# Patient Record
Sex: FEMALE | Race: OTHER | Employment: UNEMPLOYED | URBAN - METROPOLITAN AREA
[De-identification: names, ages, dates, MRNs, and addresses within clinical notes are randomized per-mention and may not be internally consistent; named-entity substitution may affect disease eponyms.]

---

## 2024-01-15 ENCOUNTER — OFFICE VISIT (OUTPATIENT)
Age: 29
End: 2024-01-15

## 2024-01-15 VITALS
SYSTOLIC BLOOD PRESSURE: 101 MMHG | BODY MASS INDEX: 24.38 KG/M2 | HEIGHT: 64 IN | WEIGHT: 142.8 LBS | DIASTOLIC BLOOD PRESSURE: 71 MMHG

## 2024-01-15 PROBLEM — O99.019 ANEMIA DURING PREGNANCY: Status: RESOLVED | Noted: 2023-10-30 | Resolved: 2024-01-15

## 2024-01-15 PROBLEM — D57.3 SICKLE CELL TRAIT (HCC): Status: ACTIVE | Noted: 2023-05-31

## 2024-01-15 PROBLEM — D57.3 SICKLE CELL TRAIT (HCC): Status: RESOLVED | Noted: 2023-05-31 | Resolved: 2024-01-15

## 2024-01-15 PROBLEM — O99.820 GBS (GROUP B STREPTOCOCCUS CARRIER), +RV CULTURE, CURRENTLY PREGNANT: Status: RESOLVED | Noted: 2023-11-01 | Resolved: 2024-01-15

## 2024-01-15 PROCEDURE — 0503F POSTPARTUM CARE VISIT: CPT | Performed by: OBSTETRICS & GYNECOLOGY

## 2024-01-15 RX ORDER — BENZOCAINE/MENTHOL 6 MG-10 MG
LOZENGE MUCOUS MEMBRANE
Qty: 30 G | Refills: 1 | Status: SHIPPED | OUTPATIENT
Start: 2024-01-15 | End: 2024-01-22

## 2024-01-15 NOTE — PROGRESS NOTES
Trish Levine is a 28 y.o. female returns for a routine post-partum follow-up visit     Chief Complaint   Patient presents with    Postpartum Care       Postpartum Depression: Low Risk  (2023)    [place]  Depression Scale     Last EPDS Total Score: 1     Last EPDS Self Harm Result: Never       Current EPDS score:  1    Type of delivery: primary  section  Date of Delivery: 2023  Breastfeeding: yes  Bleeding Resolved: yes  Birth Control: discuss.  Last Pap: normal obtained 2023.        Problems: no problems    1. Have you been to the ER, urgent care clinic, or hospitalized since your last visit? No    2. Have you seen or consulted any other health care providers outside of the [facility] since your last visit? No    Examination chaperoned by NEERAJ DEMARCO CMA.

## 2024-01-29 ENCOUNTER — PROCEDURE VISIT (OUTPATIENT)
Age: 29
End: 2024-01-29
Payer: MEDICAID

## 2024-01-29 VITALS
DIASTOLIC BLOOD PRESSURE: 67 MMHG | HEIGHT: 64 IN | WEIGHT: 140 LBS | SYSTOLIC BLOOD PRESSURE: 100 MMHG | BODY MASS INDEX: 23.9 KG/M2

## 2024-01-29 DIAGNOSIS — Z30.430 ENCOUNTER FOR INSERTION OF MIRENA IUD: Primary | ICD-10-CM

## 2024-01-29 LAB
HCG, PREGNANCY, URINE, POC: NEGATIVE
VALID INTERNAL CONTROL, POC: YES

## 2024-01-29 PROCEDURE — 58300 INSERT INTRAUTERINE DEVICE: CPT | Performed by: OBSTETRICS & GYNECOLOGY

## 2024-01-29 PROCEDURE — 81025 URINE PREGNANCY TEST: CPT | Performed by: OBSTETRICS & GYNECOLOGY

## 2024-01-29 RX ORDER — HYDROCORTISONE 10 MG/G
CREAM TOPICAL 2 TIMES DAILY
COMMUNITY
Start: 2024-01-15

## 2024-01-29 NOTE — PROGRESS NOTES
Trish Isaacs is a 28 y.o. female presents for a problem visit.    Chief Complaint   Patient presents with    Procedure     IUD insertion              No LMP recorded.    Birth Control: has not had intercourse.    Last Pap: normal obtained 6/2023.        1. Have you been to the ER, urgent care clinic, or hospitalized since your last visit? No    2. Have you seen or consulted any other health care providers outside of the [facility] since your last visit? No    Device number [id number], expiration date 1/31/2026    Chart reviewed for the following:   NILAM ALEMAN MARGARET PATTIE, CMA, have reviewed the History, Physical and updated the Allergic reactions for Trish Isaacs     TIME OUT performed immediately prior to start of procedure:   NILAM ALEMAN MARGARET PATTIE, CMA, have performed the following reviews on Trish Isaacs prior to the start of the procedure:            * Patient was identified by name and date of birth   * Agreement on procedure being performed was verified  * Risks and Benefits explained to the patient  * Procedure site verified and marked as necessary  * Patient was positioned for comfort  * Consent was signed and verified     Time: 3:10pm    Date of procedure: 1/29/2024    Procedure performed by:  Nitin Morin MD       Provider assisted by: Emi Olivera MA    Patient assisted by: self    How tolerated by patient: tolerated the procedure well with no complications    Post Procedural Pain Scale: 2 - Hurts Little Bit    Comments: none    Examination chaperoned by NEERAJ OLIVERA CMA.

## 2024-01-29 NOTE — PROGRESS NOTES
Mirena IUD INSERTION  Indications:  Trish Isaacs is a ,  28 y.o. female  /  No LMP recorded.  Her LMP was normal in duration and amount of flow. She  presents for insertion of an IUD.    The risks, benefits and alternatives of IUD insertion were discussed in detail at last visit.  She also has reviewed Mirena information. She has elected to proceed with the insertion today and she states she has no further questions. A urine pregnancy test was negative No results found for: \"SPEP\", \"UPEP\"    Procedure:  The pelvic exam revealed normal external genitalia. On bimanual exam the uterus was anteverted and normal in size with no tenderness present. A speculum was inserted into the vagina and the cervix was visualized. The cervix was prepped with zephiran solution. The anterior lip of the cervix was grasped with a single toothed tenaculum. The uterus was sounded with a Sumner sound to 7 centimeters. A Mirena was then inserted without difficulty. The string was cut to 3 centimeters.She experienced a moderate  amount of cramping.     Post Procedure Status:   She tolerated the procedure with mild discomfort. The patient was observed for 5 minutes after the insertion. There were no complications.   Patient was discharged in stable condition.    The patient received Mirena lot number [id number], expiration date 2026 .    ASSESSMENT   Diagnosis Orders   1. Encounter for insertion of mirena IUD          PLAN  Return in about 1 month (around 2024) for IUD Check.

## 2024-02-29 ENCOUNTER — OFFICE VISIT (OUTPATIENT)
Age: 29
End: 2024-02-29
Payer: MEDICAID

## 2024-02-29 VITALS
HEIGHT: 64 IN | SYSTOLIC BLOOD PRESSURE: 102 MMHG | WEIGHT: 138.6 LBS | DIASTOLIC BLOOD PRESSURE: 68 MMHG | BODY MASS INDEX: 23.66 KG/M2

## 2024-02-29 DIAGNOSIS — Z30.431 IUD CHECK UP: Primary | ICD-10-CM

## 2024-02-29 PROCEDURE — 99213 OFFICE O/P EST LOW 20 MIN: CPT | Performed by: OBSTETRICS & GYNECOLOGY

## 2024-02-29 NOTE — PROGRESS NOTES
Trish Isaacs is a 28 y.o. female presents for a problem visit.    Chief Complaint   Patient presents with    Follow-up     No LMP recorded. (Menstrual status: IUD).  Birth Control: IUD.  Last Pap: normal obtained 6/2023.    The patient is reporting having: IUD Follow Up  inserted 1/29/2024.        1. Have you been to the ER, urgent care clinic, or hospitalized since your last visit? No    2. Have you seen or consulted any other health care providers outside of the [facility] since your last visit? No    Examination chaperoned by NEERAJ DEMARCO CMA.  
Never   Vaping Use    Vaping Use: Never used   Substance and Sexual Activity    Alcohol use: Not Currently    Drug use: Never    Sexual activity: Yes     Partners: Male      History reviewed. No pertinent family history.  Current Outpatient Medications on File Prior to Visit   Medication Sig Dispense Refill    Prenatal Vit-Fe Fumarate-FA (PRENATAL VITAMIN PO) Take by mouth      ibuprofen (ADVIL;MOTRIN) 800 MG tablet Take 1 tablet by mouth every 6 hours as needed for Pain 60 tablet 1     No current facility-administered medications on file prior to visit.     No Known Allergies     Review of Systems: History obtained from the patient  Constitutional: negative for weight loss, fever, night sweats  Breast: negative for breast lumps, nipple discharge, galactorrhea  GI: negative for change in bowel habits, abdominal pain, black or bloody stools  : negative for frequency, dysuria, hematuria, vaginal discharge  MSK: negative for back pain, joint pain, muscle pain  Skin: negative for itching, rash, hives  Psych: negative for anxiety, depression, change in mood      Objective:  /68   Ht 1.626 m (5' 4\")   Wt 62.9 kg (138 lb 9.6 oz)   Breastfeeding Yes   BMI 23.79 kg/m²     Physical Exam:   PHYSICAL EXAMINATION    Constitutional  Appearance: well-nourished, well developed, alert, in no acute distress    Gastrointestinal  Abdominal Examination: abdomen non-tender to palpation, normal bowel sounds, no masses present  Liver and spleen: no hepatomegaly present, spleen not palpable  Hernias: no hernias identified    Genitourinary  External Genitalia: normal appearance for age, no discharge present, no tenderness present, no inflammatory lesions present, no masses present, no atrophy present  Vagina: normal vaginal vault without central or paravaginal defects, no discharge present, no inflammatory lesions present, no masses present  Bladder: non-tender to palpation  Urethra: appears normal  Cervix: normal with IUD string

## 2025-03-07 NOTE — PROGRESS NOTES
Trish Isaacs is a 29 y.o. female returns for an annual exam     LMP 3/9/25  Her periods are moderate in flow and usually regular with a 26-32 day interval with 3-7 day duration.  She does not have dysmenorrhea.  Problems: no problems  Birth Control: IUD.  Last Pap: normal obtained 6/2023.  She does not have a history of DAVID 2, 3 or cervical cancer.   With regard to the Gardisil vaccine, she is unsure if she has had series      1. Have you been to the ER, urgent care clinic, or hospitalized since your last visit? Yes    2. Have you seen or consulted any other health care providers outside of the [facility] since your last visit? Yes    Examination chaperoned by NEERAJ DEMARCO CMA.

## 2025-03-10 ENCOUNTER — OFFICE VISIT (OUTPATIENT)
Age: 30
End: 2025-03-10
Payer: MEDICAID

## 2025-03-10 VITALS
OXYGEN SATURATION: 98 % | BODY MASS INDEX: 27.78 KG/M2 | DIASTOLIC BLOOD PRESSURE: 57 MMHG | HEART RATE: 89 BPM | SYSTOLIC BLOOD PRESSURE: 99 MMHG | WEIGHT: 156.8 LBS | TEMPERATURE: 98 F | HEIGHT: 63 IN

## 2025-03-10 DIAGNOSIS — Z01.419 WELL WOMAN EXAM WITH ROUTINE GYNECOLOGICAL EXAM: Primary | ICD-10-CM

## 2025-03-10 PROCEDURE — 99395 PREV VISIT EST AGE 18-39: CPT | Performed by: OBSTETRICS & GYNECOLOGY

## 2025-03-10 SDOH — ECONOMIC STABILITY: FOOD INSECURITY: WITHIN THE PAST 12 MONTHS, THE FOOD YOU BOUGHT JUST DIDN'T LAST AND YOU DIDN'T HAVE MONEY TO GET MORE.: NEVER TRUE

## 2025-03-10 SDOH — ECONOMIC STABILITY: FOOD INSECURITY: WITHIN THE PAST 12 MONTHS, YOU WORRIED THAT YOUR FOOD WOULD RUN OUT BEFORE YOU GOT MONEY TO BUY MORE.: NEVER TRUE

## 2025-03-10 ASSESSMENT — PATIENT HEALTH QUESTIONNAIRE - PHQ9
1. LITTLE INTEREST OR PLEASURE IN DOING THINGS: NOT AT ALL
SUM OF ALL RESPONSES TO PHQ QUESTIONS 1-9: 0
2. FEELING DOWN, DEPRESSED OR HOPELESS: NOT AT ALL
SUM OF ALL RESPONSES TO PHQ QUESTIONS 1-9: 0

## 2025-03-10 NOTE — PROGRESS NOTES
ANNUAL EXAM AGES 18-39    History:  Mundo Isaacs is a 29 y.o. year old   /  female   Patient's last menstrual period was 2025 (exact date).    She presents for her annual checkup.     LMP 3/9/25  Her periods are moderate in flow and usually regular with a 26-32 day interval with 3-7 day duration.  She does not have dysmenorrhea.  Problems: no problems  Birth Control: IUD.  Last Pap: normal obtained 2023.  She does not have a history of DAVID 2, 3 or cervical cancer.   With regard to the Gardisil vaccine, she is unsure if she has had series    Medical History:  Problem List:  There are no active problems to display for this patient.    Past Medical History:   Diagnosis Date    Anemia during pregnancy 10/30/2023    GBS (group B Streptococcus carrier), +RV culture, currently pregnant 2023    IUD (intrauterine device) in place 2024    Mirena.   Had IUD (?Mirena) years ago and had severe cramping/dyspareunia x3mo    Macrosomia 2023    98% = 10'2\" @ 40w    Sickle cell trait 2023    Formatting of this note might be different from the original.   Discussed with pt and spouse at 17w appt - many questions.       Would like partner testing, however he is uninsured --> Referral to Mary Cash [facility]ely to call client and discuss HgbAS + discuss partner testing options.       : Per chart review, discussion completed with genetic counselor, Mary.       Partner testing ordered:      Past Surgical History:   Procedure Laterality Date     SECTION N/A 2023     SECTION performed by Nitin Morin II, MD at [facility]       OB History    Para Term  AB Living   1 1 1 0 0 1   SAB IAB Ectopic Molar Multiple Live Births   0 0 0 0 0 1      # Outcome Date GA Lbr Tim/2nd Weight Sex Type Anes PTL Lv   1 Term 23 39w2d  3.82 kg (8 lb 6.8 oz) M CS-LTranv Spinal N DHIRAJ      Name: MAXIMILIANO,MALE MUNDO      Apgar1: 9  Apgar5: 9     Social History

## 2025-03-20 ENCOUNTER — RESULTS FOLLOW-UP (OUTPATIENT)
Facility: HOSPITAL | Age: 30
End: 2025-03-20

## 2025-03-24 ENCOUNTER — PROCEDURE VISIT (OUTPATIENT)
Age: 30
End: 2025-03-24
Payer: MEDICAID

## 2025-03-24 VITALS
BODY MASS INDEX: 28.21 KG/M2 | HEIGHT: 63 IN | DIASTOLIC BLOOD PRESSURE: 67 MMHG | SYSTOLIC BLOOD PRESSURE: 100 MMHG | WEIGHT: 159.2 LBS | HEART RATE: 92 BPM | RESPIRATION RATE: 16 BRPM

## 2025-03-24 DIAGNOSIS — Z30.432 ENCOUNTER FOR IUD REMOVAL: Primary | ICD-10-CM

## 2025-03-24 DIAGNOSIS — Z31.9 DESIRE FOR PREGNANCY: ICD-10-CM

## 2025-03-24 PROCEDURE — 58301 REMOVE INTRAUTERINE DEVICE: CPT | Performed by: OBSTETRICS & GYNECOLOGY

## 2025-03-24 NOTE — PROGRESS NOTES
Trish Isaacs is a 29 y.o. female presents for a problem visit.    Chief Complaint   Patient presents with    Procedure     IUD removal              Patient's last menstrual period was 03/09/2025 (exact date).    Birth Control: IUD.    Last Pap: normal obtained 3/10/2025.        1. Have you been to the ER, urgent care clinic, or hospitalized since your last visit? No    2. Have you seen or consulted any other health care providers outside of the [facility] since your last visit? No      Chart reviewed for the following:   NILAM ALEMAN MARGARET PATTIE, CMA, have reviewed the History, Physical and updated the Allergic reactions for Trish Isaacs     TIME OUT performed immediately prior to start of procedure:   NILAM ALEMAN MARGARET PATTIE, CMA, have performed the following reviews on Trish Isaacs prior to the start of the procedure:            * Patient was identified by name and date of birth   * Agreement on procedure being performed was verified  * Risks and Benefits explained to the patient  * Procedure site verified and marked as necessary  * Patient was positioned for comfort  * Consent was signed and verified     Time: 4:10pm    Date of procedure: 3/24/2025    Procedure performed by:  Nitin Morin MD       Provider assisted by: Emi Olivera MA    Patient assisted by: self    How tolerated by patient: tolerated the procedure well with no complications    Post Procedural Pain Scale: 0 - No Hurt    Comments: none    Examination chaperoned by NEERAJ OLIVERA CMA.

## 2025-06-20 DIAGNOSIS — N91.2 AMENORRHEA: Primary | ICD-10-CM

## 2025-06-26 DIAGNOSIS — N91.2 AMENORRHEA: Primary | ICD-10-CM

## 2025-07-10 NOTE — PROGRESS NOTES
Trish Isaacs is a 29 y.o. female presents for a new pregnancy visit.    Chief Complaint   Patient presents with    Routine Prenatal Visit    Pregnancy Ultrasound       Patient's last menstrual period was 2025.    Last Pap: see report obtained 3/20/25    LMP history:  The date of her LMP is 25 certain.  Her last menstrual period was normal and lasted for 4 to 5 days.     Based on her LMP, her EDC is 26 and her EGA is 8 weeks,6 days. Her menstrual cycles are regular and occur approximately every 28 days and range from 3 to 5 days.     Ultrasound data:  She had an ultrasound done by the ultrasound tech today which revealed a viable boss pregnancy with a gestational age of 9 weeks and 0 days giving an EDC of 2026.    Pregnancy symptoms:    Since her LMP she has experienced urinary frequency, breast tenderness, and nausea.   She has been vomiting over the last few weeks.  Associated signs and symptoms which she denies: dysuria, discharge, vaginal bleeding.    She states she has gained weight:  Approximately 5 pounds over the last few weeks.    Relevant past pregnancy history:   She has the following pregnancy history:     She has no history of  delivery.    Relevant past medical history:(relevant to this pregnancy): noncontributory.      Her occupation is: stay at home.     1. Have you been to the ER, urgent care clinic, or hospitalized since your last visit? No    2. Have you seen or consulted any other health care providers outside of the [facility] since your last visit? No    Examination chaperoned by Hazel Ely CMA.

## 2025-07-14 ENCOUNTER — ROUTINE PRENATAL (OUTPATIENT)
Age: 30
End: 2025-07-14

## 2025-07-14 VITALS
RESPIRATION RATE: 16 BRPM | BODY MASS INDEX: 28.24 KG/M2 | HEIGHT: 63 IN | WEIGHT: 159.4 LBS | HEART RATE: 80 BPM | DIASTOLIC BLOOD PRESSURE: 68 MMHG | SYSTOLIC BLOOD PRESSURE: 101 MMHG | OXYGEN SATURATION: 99 %

## 2025-07-14 DIAGNOSIS — Z34.81 ENCOUNTER FOR SUPERVISION OF OTHER NORMAL PREGNANCY IN FIRST TRIMESTER: ICD-10-CM

## 2025-07-14 DIAGNOSIS — Z3A.09 9 WEEKS GESTATION OF PREGNANCY: Primary | ICD-10-CM

## 2025-07-14 LAB
ABO + RH BLD: NORMAL
BLOOD BANK CMNT PATIENT-IMP: NORMAL
BLOOD GROUP ANTIBODIES SERPL: NORMAL
SPECIMEN EXP DATE BLD: NORMAL

## 2025-07-14 PROCEDURE — 0500F INITIAL PRENATAL CARE VISIT: CPT | Performed by: OBSTETRICS & GYNECOLOGY

## 2025-07-14 NOTE — PROGRESS NOTES
NEW OB VISIT    Current pregnancy history:    Trish Isaacs is a ,  29 y.o. female  /  Patient's last menstrual period was 2025..  She presents for the evaluation of amenorrhea and a positive pregnancy test.    No LMP recorded. (Menstrual status: IUD).     Last Pap: see report obtained 3/20/25     LMP history:  The date of her LMP is 25 certain.  Her last menstrual period was normal and lasted for 4 to 5 days.  A urine pregnancy test was positive 4 weeks ago. She was not on the pill at conception.      Based on her LMP, her EDC is 26 and her EGA is 10 weeks,3 days. Her menstrual cycles are regular and occur approximately every 28 days and range from 3 to 5 days.     Ultrasound data:  She had an ultrasound done by the ultrasound tech today which revealed a viable boss pregnancy with a gestational age of 9 weeks and 0 days giving an EDC of .     US OB TRANSVAGINAL  Result Date: 2025  Transabdominal ultrasound images are reviewed today and demonstrate a boss live intrauterine gestation with active fetal cardiac activity noted.  Fetal ventricular heart rate is 162 bpm.  Average crown-rump length measurements today are consistent with 9 weeks and 0 days this does not confirm dating by LMP.  Based on the current ultrasound dating SCARLETT is 2026.  Yolk sac is seen today and appears normal.  There is a hypoechoic space seen adjacent to the gestational sac measuring 15 x 6 x 2 mm that likely represents a subchorionic hemorrhage.  The right ovary appears within normal limits.  The left ovary appears within normal limits with a small cystic structure measuring 20 x 12 x 17 mm.  There is no free fluid seen in the cul-de-sac.      Pregnancy symptoms:     Since her LMP she has experienced urinary frequency, breast tenderness, and nausea.   She has not been vomiting over the last few weeks.  Associated signs and symptoms which she denies: dysuria, discharge,

## 2025-07-15 PROBLEM — Z31.9 DESIRE FOR PREGNANCY: Status: RESOLVED | Noted: 2025-03-24 | Resolved: 2025-07-15

## 2025-07-15 PROBLEM — Z78.9 NOT IMMUNE TO MEASLES: Status: ACTIVE | Noted: 2025-07-15

## 2025-07-15 PROBLEM — E55.9 VITAMIN D DEFICIENCY: Status: ACTIVE | Noted: 2025-07-15

## 2025-07-15 LAB
25(OH)D3 SERPL-MCNC: 18.4 NG/ML (ref 30–100)
BASOPHILS # BLD: 0.02 K/UL (ref 0–0.1)
BASOPHILS NFR BLD: 0.2 % (ref 0–1)
DIFFERENTIAL METHOD BLD: NORMAL
EOSINOPHIL # BLD: 0.2 K/UL (ref 0–0.4)
EOSINOPHIL NFR BLD: 2.2 % (ref 0–7)
ERYTHROCYTE [DISTWIDTH] IN BLOOD BY AUTOMATED COUNT: 13.1 % (ref 11.5–14.5)
EST. AVERAGE GLUCOSE BLD GHB EST-MCNC: 105 MG/DL
HBA1C MFR BLD: 5.3 % (ref 4–5.6)
HBV SURFACE AG SER QL: <0.1 INDEX
HBV SURFACE AG SER QL: NEGATIVE
HCT VFR BLD AUTO: 35.3 % (ref 35–47)
HCV AB SER IA-ACNC: <0.02 INDEX
HCV AB SERPL QL IA: NONREACTIVE
HGB BLD-MCNC: 11.6 G/DL (ref 11.5–16)
HIV 1+2 AB+HIV1 P24 AG SERPL QL IA: NONREACTIVE
HIV 1/2 RESULT COMMENT: NORMAL
IMM GRANULOCYTES # BLD AUTO: 0.02 K/UL (ref 0–0.04)
IMM GRANULOCYTES NFR BLD AUTO: 0.2 % (ref 0–0.5)
LYMPHOCYTES # BLD: 2.4 K/UL (ref 0.8–3.5)
LYMPHOCYTES NFR BLD: 26.5 % (ref 12–49)
MCH RBC QN AUTO: 27.9 PG (ref 26–34)
MCHC RBC AUTO-ENTMCNC: 32.9 G/DL (ref 30–36.5)
MCV RBC AUTO: 84.9 FL (ref 80–99)
MEV IGG SER IA-ACNC: <13.5 AU/ML
MONOCYTES # BLD: 0.61 K/UL (ref 0–1)
MONOCYTES NFR BLD: 6.7 % (ref 5–13)
MUV IGG SER IA-ACNC: 56.4 AU/ML
NEUTS SEG # BLD: 5.8 K/UL (ref 1.8–8)
NEUTS SEG NFR BLD: 64.2 % (ref 32–75)
NRBC # BLD: 0 K/UL (ref 0–0.01)
NRBC BLD-RTO: 0 PER 100 WBC
PLATELET # BLD AUTO: 199 K/UL (ref 150–400)
PMV BLD AUTO: 10.6 FL (ref 8.9–12.9)
RBC # BLD AUTO: 4.16 M/UL (ref 3.8–5.2)
RPR SER QL: NONREACTIVE
RUBV IGG SERPL IA-ACNC: 5.43 INDEX
WBC # BLD AUTO: 9.1 K/UL (ref 3.6–11)

## 2025-08-11 ENCOUNTER — ROUTINE PRENATAL (OUTPATIENT)
Age: 30
End: 2025-08-11

## 2025-08-11 VITALS
SYSTOLIC BLOOD PRESSURE: 94 MMHG | HEIGHT: 63 IN | HEART RATE: 98 BPM | OXYGEN SATURATION: 99 % | RESPIRATION RATE: 16 BRPM | BODY MASS INDEX: 28.33 KG/M2 | DIASTOLIC BLOOD PRESSURE: 62 MMHG | WEIGHT: 159.9 LBS

## 2025-08-11 DIAGNOSIS — Z3A.13 13 WEEKS GESTATION OF PREGNANCY: Primary | ICD-10-CM

## 2025-08-11 PROCEDURE — 0502F SUBSEQUENT PRENATAL CARE: CPT | Performed by: OBSTETRICS & GYNECOLOGY
